# Patient Record
Sex: MALE | Race: WHITE | NOT HISPANIC OR LATINO | Employment: FULL TIME | URBAN - METROPOLITAN AREA
[De-identification: names, ages, dates, MRNs, and addresses within clinical notes are randomized per-mention and may not be internally consistent; named-entity substitution may affect disease eponyms.]

---

## 2019-10-19 ENCOUNTER — HOSPITAL ENCOUNTER (OUTPATIENT)
Dept: URGENT CARE | Facility: CLINIC | Age: 24
Discharge: HOME | End: 2019-10-19

## 2019-10-21 LAB — BACTERIA SPEC AEROBE CULT: NORMAL

## 2020-08-11 ENCOUNTER — CONVERSION ENCOUNTER (OUTPATIENT)
Dept: SURGERY | Facility: CLINIC | Age: 25
End: 2020-08-11

## 2020-08-11 ENCOUNTER — OFFICE VISIT CONVERTED (OUTPATIENT)
Dept: SURGERY | Facility: CLINIC | Age: 25
End: 2020-08-11
Attending: SURGERY

## 2020-08-18 ENCOUNTER — OFFICE VISIT CONVERTED (OUTPATIENT)
Dept: SURGERY | Facility: CLINIC | Age: 25
End: 2020-08-18
Attending: SURGERY

## 2020-09-12 ENCOUNTER — HOSPITAL ENCOUNTER (OUTPATIENT)
Dept: PREADMISSION TESTING | Facility: HOSPITAL | Age: 25
Discharge: HOME | End: 2020-09-12
Attending: SURGERY

## 2020-09-14 LAB — SARS-COV-2 RNA SPEC QL NAA+PROBE: NOT DETECTED

## 2020-09-17 ENCOUNTER — HOSPITAL ENCOUNTER (OUTPATIENT)
Dept: PERIOP | Facility: HOSPITAL | Age: 25
Setting detail: HOSPITAL OUTPATIENT SURGERY
Discharge: HOME | End: 2020-09-17
Attending: SURGERY

## 2020-09-25 ENCOUNTER — OFFICE VISIT CONVERTED (OUTPATIENT)
Dept: SURGERY | Facility: CLINIC | Age: 25
End: 2020-09-25
Attending: SURGERY

## 2020-10-02 ENCOUNTER — OFFICE VISIT CONVERTED (OUTPATIENT)
Dept: SURGERY | Facility: CLINIC | Age: 25
End: 2020-10-02
Attending: SURGERY

## 2020-10-16 ENCOUNTER — OFFICE VISIT CONVERTED (OUTPATIENT)
Dept: SURGERY | Facility: CLINIC | Age: 25
End: 2020-10-16
Attending: SURGERY

## 2020-10-16 ENCOUNTER — HOSPITAL ENCOUNTER (OUTPATIENT)
Dept: PREADMISSION TESTING | Facility: HOSPITAL | Age: 25
Discharge: HOME | End: 2020-10-16
Attending: SURGERY

## 2020-10-17 LAB — SARS-COV-2 RNA SPEC QL NAA+PROBE: NOT DETECTED

## 2021-05-10 NOTE — H&P
History and Physical      Patient Name: Loco Green   Patient ID: [id number]   Sex: Male   YOB: 1995        Visit Date: August 18, 2020    Provider: Andrei Chappell MD   Location[facility]   Location Address: [address] [place] [address]  [id number]   Location Phone: [phone]          Chief Complaint  · Pilonidal Cyst      History Of Present Illness  Loco Green is a 25 year old male who presents to the office today as a consult from [facility].      The patient was just seen by week about one to two weeks ago.  Most with pilonidal disease and I told him what would need to be done for surgery and requested that he go home and review some videos online before making a decision about proceeding with surgery.  Patient has done that and says he wants to proceed with surgery.  The office visit today was spent discussing the details of the surgery.  Copy and paste of the HPI and plan section from the prior office visit is available below.  There are no changes.    Copy & Paste of HPI Section from Prior Office Visit  The patient was referred to see me after he went to a local urgent care center for a draining area at his upper buttock area.  The patient says he had an incision and drainage of a pilonidal cyst about 5 years ago.  Since then the skin where the incision and drainage was performed occasionally pops open and drains fluid and he has pain when this occurs.  Patient does not have any pain right now.  No fevers.  Patient wants to have something done to eliminate this persistent problem.    Copy & Paste of Plan Section from Prior Office Visit  Diagnosis of pilonidal disease was discussed with the patient.  We talked about the procedure I would perform to hopefully eliminate his problems with pilonidal disease.  The specific procedure is a [place] cleft lift procedure.  I informed the patient about the expected recovery time.  And is for the patient to go home and  "research the procedure I reform on the Internet and watch videos that are available on the Internet and then schedule an appointment to see me again sometime when he would like to have the procedure performed.       Past Medical History  Disease Name Date Onset Notes   Pilonidal cyst --  --          Past Surgical History  Procedure Name Date Notes   Finger Surgery --  --    Tonsilectomy --  --          Allergy List  Allergen Name Date Reaction Notes   NO KNOWN DRUG ALLERGIES --  --  --        Allergies Reconciled  Social History  Finding Status Start/Stop Quantity Notes   Tobacco Current some day --/-- --  --          Review of Systems  · Constitutional  o Denies  o : chills, fever  · Gastrointestinal  o Denies  o : nausea, vomiting      Vitals  Date Time BP Position Site L\R Cuff Size HR RR TEMP (F) WT  HT  BMI kg/m2 BSA m2 O2 Sat HC       08/11/2020 10:05 AM       12  198lbs 8oz 5'  10\" 28.48 2.11     08/18/2020 08:32 AM       14  198lbs 0oz 5'  10\" 28.41 2.11           Physical Examination  · Constitutional  o Appearance  o : healthy appearing, alert and in no acute distress, reliable historian, here alone  · Head and Face  o Head  o :   § Inspection  § : no visable deformities or lesions  · Eyes  o Conjunctivae  o : clear  o Sclerae  o : clear  · Neck  o Inspection/Palpation  o : normal appearance, no masses, trachea midline  · Respiratory  o Respiratory Effort  o : breathing unlabored, respiratory effort appears normal  o Inspection of Chest  o : normal appearance, no retractions  · Cardiovascular  o Heart  o : regular rate and rhythm  · Gastrointestinal  o Abdominal Examination  o :   § Abdomen  § : soft, nontender, nondistended  · Skin and Subcutaneous Tissue  o General Inspection  o : Just to the left side of the upper part of the gluteal cleft, there is a area about 1 to 2 cm in diameter that is mildly red and there is an opening in the skin. There is no significant tenderness at this area. In the midline " gluteal cleft there are several visible skin pits. There is a moderate amount of hair on the buttock skin and the patient has a fairly deep gluteal cleft.  · Neurologic  o Cranial Nerves  o : no obvious motor deficits  o Sensation  o : no obvious sensory deficits  o Gait and Station  o :   § Gait Screening  § : normal gait, able to stand without diffculty  o Cerebellar Function  o : no obvious abnormalities  · Psychiatric  o Judgement and Insight  o : judgment and insight intact  o Mood and Affect  o : mood normal, affect appropriate          Assessment  · Pre-Surgical Orders     V72.84  · Pilonidal disease     709.8/L98.8      Plan  · Orders  o GENERAL SURGERY (GNSUR) - V72.84 - 09/17/2020  · Medications  o Medications have been Reconciled  o Transition of Care or Provider Policy  · Instructions  o PLAN: Pilonidal cystectomy with cleft lift procedure  o PLEASE SIGN PERMIT FOR: Pilonidal cystectomy with cleft lift procedure  o Anesthesia: General   o Outpatient  o O.R. PREP: Per protocol  o IV: Per Anesthesia  o SCD's preoperatively  o *__Clindamycin 900 mg IV on call to OR.  o The indications, options, risks, benefits, and expected outcomes of the planned procedure were discussed with the patient and the patient agrees to proceed.   o Surgical Facility: [facility]            Electronically Signed by: Andrei Chappell MD -Author on August 18, 2020 09:02:41 AM

## 2021-05-10 NOTE — H&P
"   History and Physical      Patient Name: Loco Green   Patient ID: [id number]   Sex: Male   YOB: 1995    Primary Care Provider: No PCP No PCP Other    Visit Date: August 11, 2020    Provider: Andrei Chappell MD   Location[facility]   Location Address: [address] [place] [address]  [id number]   Location Phone: [phone]          Chief Complaint  · Pilonidal Cyst      History Of Present Illness  Loco Green is a 24 year old male who presents to the office today as a consult from [facility].      The patient was referred to see me after he went to a local urgent care center for a draining area at his upper buttock area.  The patient says he had an incision and drainage of a pilonidal cyst about 5 years ago.  Since then the skin where the incision and drainage was performed occasionally pops open and drains fluid and he has pain when this occurs.  Patient does not have any pain right now.  No fevers.  Patient wants to have something done to eliminate this persistent problem.       Past Surgical History  Procedure Name Date Notes   Finger Surgery --  --    Tonsilectomy --  --          Allergy List  Allergen Name Date Reaction Notes   NO KNOWN DRUG ALLERGIES --  --  --          Social History  Finding Status Start/Stop Quantity Notes   Tobacco Current some day --/-- --  --          Review of Systems  · Constitutional  o Denies  o : chills, fever  · Gastrointestinal  o Denies  o : nausea, vomiting      Vitals  Date Time BP Position Site L\R Cuff Size HR RR TEMP (F) WT  HT  BMI kg/m2 BSA m2 O2 Sat        08/11/2020 10:05 AM       12  198lbs 8oz 5'  10\" 28.48 2.11           Physical Examination  · Constitutional  o Appearance  o : healthy appearing, alert and in no acute distress, reliable historian, here alone  · Head and Face  o Head  o :   § Inspection  § : no visable deformities or lesions  · Eyes  o Conjunctivae  o : clear  o Sclerae  o : " clear  · Neck  o Inspection/Palpation  o : normal appearance, no masses, trachea midline  · Respiratory  o Respiratory Effort  o : breathing unlabored, respiratory effort appears normal  o Inspection of Chest  o : normal appearance, no retractions  · Cardiovascular  o Heart  o : regular rate and rhythm  · Gastrointestinal  o Abdominal Examination  o :   § Abdomen  § : soft, nontender, nondistended  · Skin and Subcutaneous Tissue  o General Inspection  o : Just to the left side of the upper part of the gluteal cleft, there is a area about 1 to 2 cm in diameter that is mildly red and there is an opening in the skin. There is no significant tenderness at this area. In the midline gluteal cleft there are several visible skin pits. There is a moderate amount of hair on the buttock skin and the patient has a fairly deep gluteal cleft.  · Neurologic  o Cranial Nerves  o : no obvious motor deficits  o Sensation  o : no obvious sensory deficits  o Gait and Station  o :   § Gait Screening  § : normal gait, able to stand without diffculty  o Cerebellar Function  o : no obvious abnormalities  · Psychiatric  o Judgement and Insight  o : judgment and insight intact  o Mood and Affect  o : mood normal, affect appropriate          Assessment  · Pilonidal disease     709.8/L98.8      Plan  · Medications  o Medications have been Reconciled  o Transition of Care or Provider Policy  · Instructions  o Electronically Identified Patient Education Materials Provided Electronically     Diagnosis of pilonidal disease was discussed with the patient.  We talked about the procedure I would perform to hopefully eliminate his problems with pilonidal disease.  The specific procedure is a [place] cleft lift procedure.  I informed the patient about the expected recovery time.  And is for the patient to go home and research the procedure I reform on the Internet and watch videos that are available on the Internet and then schedule an appointment to  see me again sometime when he would like to have the procedure performed.             Electronically Signed by: Andrei Chappell MD -Author on August 11, 2020 10:30:52 AM

## 2021-05-13 NOTE — PROGRESS NOTES
"   Progress Note      Patient Name: Loco Green   Patient ID: [id number]   Sex: Male   YOB: 1995        Visit Date: September 25, 2020    Provider: Andrei Chappell MD   Location: [facility]   Location Address: [address] [place] [address]  [id number]   Location Phone: [phone]          Chief Complaint  · Follow up Surgery      History Of Present Illness  Loco Green is a 25 year old male who presents today for a postoperative visit.        Patient is here for his first follow-up appointment after excision of pilonidal disease with a cleft lift procedure 8 days ago.  He is doing very well.  I removed the drain today.  Fortunately, the incision is healing very well thus far.  Assessment is the patient is recovering satisfactorily after his surgery.  No new issues to address.  I will have the patient see me again next week to remove some of the sutures.            Vitals  Date Time BP Position Site L\R Cuff Size HR RR TEMP (F) WT  HT  BMI kg/m2 BSA m2 O2 Sat HC       09/25/2020 02:31 PM       12  204lbs 4oz 5'  10\" 29.31 2.14               Assessment  · Postoperative Exam Following Surgery     V67.00      Plan  · Medications  o Medications have been Reconciled  o Transition of Care or Provider Policy  · Instructions  o See Above HPI section.  o Electronically Identified Patient Education Materials Provided Electronically            Electronically Signed by: Andrei Chappell MD -Author on September 25, 2020 03:44:08 PM  "

## 2021-05-13 NOTE — PROGRESS NOTES
"   Progress Note      Patient Name: Loco Green   Patient ID: [id number]   Sex: Male   YOB: 1995        Visit Date: October 16, 2020    Provider: Andrei Chappell MD   Location: [facility]   Location Address: [address] [place] [address]  [id number]   Location Phone: [phone]          Chief Complaint  · Follow up Surgery  · Follow Up Visit      History Of Present Illness  Loco Green is a 25 year old male who presents today for a postoperative visit.      The patient is here for another follow-up appointment after excision of pilonidal disease.  He continues to do very well and has no complaints.  Incision has healed very well.  No new issues to address.  Patient seems very pleased with his progress.  He is excused to return to work without restrictions on 10/19/2020.  He can see me PRN.       Vitals  Date Time BP Position Site L\R Cuff Size HR RR TEMP (F) WT  HT  BMI kg/m2 BSA m2 O2 Sat FR L/min FiO2 HC       10/16/2020 01:54 PM       12  211lbs 2oz 5'  10\" 30.29 2.17                 Assessment  · Postoperative Exam Following Surgery     V67.00  · Return to work evaluation     V72.85/Z76.89      Plan  · Orders  o [facility] Scre[facility] (93850) - V72.85/Z76.89 - 10/16/2020  · Medications  o Medications have been Reconciled  o Transition of Care or Provider Policy  · Instructions  o See Above HPI section.  o Electronically Identified Patient Education Materials Provided Electronically            Electronically Signed by: Andrei Chappell MD -Author on October 16, 2020 02:27:58 PM  "

## 2021-05-13 NOTE — PROGRESS NOTES
"   Progress Note      Patient Name: Loco Green   Patient ID: [id number]   Sex: Male   YOB: 1995        Visit Date: October 2, 2020    Provider: Andrei Chappell MD   Location: [facility]   Location Address: [address] [place] [address]  [id number]   Location Phone: [phone]          Chief Complaint  · Follow up Surgery      History Of Present Illness  Loco Green is a 25 year old male who presents today for a postoperative visit.      Patient is here for another follow-up appointment after excision of pilonidal disease with a cleft lift procedure.  He continues to do well.  No complaints.  I removed the Prolene sutures today.  The wound is healing fine thus far.  My assessment is the patient is recovering satisfactorily so far.  I will have him see me again in 2 weeks for another evaluation.       Vitals  Date Time BP Position Site L\R Cuff Size HR RR TEMP (F) WT  HT  BMI kg/m2 BSA m2 O2 Sat FR L/min FiO2 HC       10/02/2020 02:30 PM       14  203lbs 16oz 5'  10\" 29.27 2.14                 Assessment  · Postoperative Exam Following Surgery     V67.00      Plan  · Medications  o Medications have been Reconciled  o Transition of Care or Provider Policy  · Instructions  o See Above HPI section.  o Electronically Identified Patient Education Materials Provided Electronically            Electronically Signed by: Andrei Chappell MD -Author on October 2, 2020 02:55:46 PM  "

## 2021-05-14 VITALS — RESPIRATION RATE: 12 BRPM | BODY MASS INDEX: 30.22 KG/M2 | WEIGHT: 211.12 LBS | HEIGHT: 70 IN

## 2021-05-14 VITALS — HEIGHT: 70 IN | BODY MASS INDEX: 28.35 KG/M2 | RESPIRATION RATE: 14 BRPM | WEIGHT: 198 LBS

## 2021-05-14 VITALS — BODY MASS INDEX: 29.2 KG/M2 | WEIGHT: 204 LBS | RESPIRATION RATE: 14 BRPM | HEIGHT: 70 IN

## 2021-05-14 VITALS — BODY MASS INDEX: 29.24 KG/M2 | HEIGHT: 70 IN | WEIGHT: 204.25 LBS | RESPIRATION RATE: 12 BRPM

## 2021-05-15 VITALS — BODY MASS INDEX: 28.42 KG/M2 | RESPIRATION RATE: 12 BRPM | WEIGHT: 198.5 LBS | HEIGHT: 70 IN

## 2024-08-14 ENCOUNTER — OFFICE VISIT (OUTPATIENT)
Dept: INTERNAL MEDICINE | Age: 29
End: 2024-08-14
Payer: COMMERCIAL

## 2024-08-14 ENCOUNTER — LAB (OUTPATIENT)
Dept: LAB | Facility: HOSPITAL | Age: 29
End: 2024-08-14
Payer: COMMERCIAL

## 2024-08-14 VITALS
TEMPERATURE: 98.4 F | OXYGEN SATURATION: 98 % | SYSTOLIC BLOOD PRESSURE: 112 MMHG | WEIGHT: 223.4 LBS | HEIGHT: 70 IN | BODY MASS INDEX: 31.98 KG/M2 | DIASTOLIC BLOOD PRESSURE: 76 MMHG | HEART RATE: 76 BPM

## 2024-08-14 DIAGNOSIS — L30.9 DERMATITIS: Primary | ICD-10-CM

## 2024-08-14 DIAGNOSIS — E66.9 OBESITY (BMI 30-39.9): ICD-10-CM

## 2024-08-14 DIAGNOSIS — Z11.59 NEED FOR HEPATITIS C SCREENING TEST: ICD-10-CM

## 2024-08-14 DIAGNOSIS — Z00.00 LABORATORY EXAMINATION ORDERED AS PART OF A ROUTINE GENERAL MEDICAL EXAMINATION: ICD-10-CM

## 2024-08-14 DIAGNOSIS — Z30.09 FAMILY PLANNING: ICD-10-CM

## 2024-08-14 DIAGNOSIS — Z83.3 FAMILY HISTORY OF DIABETES MELLITUS IN MOTHER: ICD-10-CM

## 2024-08-14 DIAGNOSIS — Z13.6 ENCOUNTER FOR SCREENING FOR CARDIOVASCULAR DISORDERS: ICD-10-CM

## 2024-08-14 DIAGNOSIS — E55.9 VITAMIN D DEFICIENCY: ICD-10-CM

## 2024-08-14 PROBLEM — L40.9 PSORIASIS: Status: ACTIVE | Noted: 2024-08-14

## 2024-08-14 LAB
25(OH)D3 SERPL-MCNC: 22.6 NG/ML (ref 30–100)
ABO GROUP BLD: NORMAL
ALBUMIN SERPL-MCNC: 4.7 G/DL (ref 3.5–5.2)
ALBUMIN UR-MCNC: 1.5 MG/DL
ALBUMIN/GLOB SERPL: 1.9 G/DL
ALP SERPL-CCNC: 79 U/L (ref 39–117)
ALT SERPL W P-5'-P-CCNC: 49 U/L (ref 1–41)
ANION GAP SERPL CALCULATED.3IONS-SCNC: 10 MMOL/L (ref 5–15)
AST SERPL-CCNC: 25 U/L (ref 1–40)
BASOPHILS # BLD AUTO: 0.02 10*3/MM3 (ref 0–0.2)
BASOPHILS NFR BLD AUTO: 0.3 % (ref 0–1.5)
BILIRUB SERPL-MCNC: 0.7 MG/DL (ref 0–1.2)
BUN SERPL-MCNC: 17 MG/DL (ref 6–20)
BUN/CREAT SERPL: 18.3 (ref 7–25)
CALCIUM SPEC-SCNC: 10 MG/DL (ref 8.6–10.5)
CHLORIDE SERPL-SCNC: 101 MMOL/L (ref 98–107)
CHOLEST SERPL-MCNC: 139 MG/DL (ref 0–200)
CO2 SERPL-SCNC: 26 MMOL/L (ref 22–29)
CREAT SERPL-MCNC: 0.93 MG/DL (ref 0.76–1.27)
CREAT UR-MCNC: 362.5 MG/DL
DEPRECATED RDW RBC AUTO: 39.6 FL (ref 37–54)
EGFRCR SERPLBLD CKD-EPI 2021: 114.7 ML/MIN/1.73
EOSINOPHIL # BLD AUTO: 0.07 10*3/MM3 (ref 0–0.4)
EOSINOPHIL NFR BLD AUTO: 1.1 % (ref 0.3–6.2)
ERYTHROCYTE [DISTWIDTH] IN BLOOD BY AUTOMATED COUNT: 12 % (ref 12.3–15.4)
FOLATE SERPL-MCNC: 7.49 NG/ML (ref 4.78–24.2)
GLOBULIN UR ELPH-MCNC: 2.5 GM/DL
GLUCOSE SERPL-MCNC: 94 MG/DL (ref 65–99)
HBA1C MFR BLD: 5.3 % (ref 4.8–5.6)
HCT VFR BLD AUTO: 45.1 % (ref 37.5–51)
HCV AB SER QL: NORMAL
HDLC SERPL-MCNC: 39 MG/DL (ref 40–60)
HGB BLD-MCNC: 15.3 G/DL (ref 13–17.7)
IMM GRANULOCYTES # BLD AUTO: 0.02 10*3/MM3 (ref 0–0.05)
IMM GRANULOCYTES NFR BLD AUTO: 0.3 % (ref 0–0.5)
LDLC SERPL CALC-MCNC: 82 MG/DL (ref 0–100)
LDLC/HDLC SERPL: 2.06 {RATIO}
LYMPHOCYTES # BLD AUTO: 1.71 10*3/MM3 (ref 0.7–3.1)
LYMPHOCYTES NFR BLD AUTO: 26.1 % (ref 19.6–45.3)
MAGNESIUM SERPL-MCNC: 2.3 MG/DL (ref 1.6–2.6)
MCH RBC QN AUTO: 31 PG (ref 26.6–33)
MCHC RBC AUTO-ENTMCNC: 33.9 G/DL (ref 31.5–35.7)
MCV RBC AUTO: 91.5 FL (ref 79–97)
MICROALBUMIN/CREAT UR: 4.1 MG/G (ref 0–29)
MONOCYTES # BLD AUTO: 0.66 10*3/MM3 (ref 0.1–0.9)
MONOCYTES NFR BLD AUTO: 10.1 % (ref 5–12)
NEUTROPHILS NFR BLD AUTO: 4.08 10*3/MM3 (ref 1.7–7)
NEUTROPHILS NFR BLD AUTO: 62.1 % (ref 42.7–76)
NRBC BLD AUTO-RTO: 0 /100 WBC (ref 0–0.2)
PLATELET # BLD AUTO: 228 10*3/MM3 (ref 140–450)
PMV BLD AUTO: 11.2 FL (ref 6–12)
POTASSIUM SERPL-SCNC: 4.7 MMOL/L (ref 3.5–5.2)
PROT SERPL-MCNC: 7.2 G/DL (ref 6–8.5)
RBC # BLD AUTO: 4.93 10*6/MM3 (ref 4.14–5.8)
RH BLD: POSITIVE
SODIUM SERPL-SCNC: 137 MMOL/L (ref 136–145)
T3FREE SERPL-MCNC: 3.68 PG/ML (ref 2–4.4)
T4 FREE SERPL-MCNC: 1.35 NG/DL (ref 0.92–1.68)
TRIGL SERPL-MCNC: 98 MG/DL (ref 0–150)
TSH SERPL DL<=0.05 MIU/L-ACNC: 1.43 UIU/ML (ref 0.27–4.2)
VIT B12 BLD-MCNC: 827 PG/ML (ref 211–946)
VLDLC SERPL-MCNC: 18 MG/DL (ref 5–40)
WBC NRBC COR # BLD AUTO: 6.56 10*3/MM3 (ref 3.4–10.8)

## 2024-08-14 PROCEDURE — 83735 ASSAY OF MAGNESIUM: CPT | Performed by: INTERNAL MEDICINE

## 2024-08-14 PROCEDURE — 82043 UR ALBUMIN QUANTITATIVE: CPT | Performed by: INTERNAL MEDICINE

## 2024-08-14 PROCEDURE — 80061 LIPID PANEL: CPT | Performed by: INTERNAL MEDICINE

## 2024-08-14 PROCEDURE — 84439 ASSAY OF FREE THYROXINE: CPT | Performed by: INTERNAL MEDICINE

## 2024-08-14 PROCEDURE — 86900 BLOOD TYPING SEROLOGIC ABO: CPT | Performed by: INTERNAL MEDICINE

## 2024-08-14 PROCEDURE — 86803 HEPATITIS C AB TEST: CPT | Performed by: INTERNAL MEDICINE

## 2024-08-14 PROCEDURE — 84443 ASSAY THYROID STIM HORMONE: CPT | Performed by: INTERNAL MEDICINE

## 2024-08-14 PROCEDURE — 80053 COMPREHEN METABOLIC PANEL: CPT | Performed by: INTERNAL MEDICINE

## 2024-08-14 PROCEDURE — 84481 FREE ASSAY (FT-3): CPT | Performed by: INTERNAL MEDICINE

## 2024-08-14 PROCEDURE — 86901 BLOOD TYPING SEROLOGIC RH(D): CPT | Performed by: INTERNAL MEDICINE

## 2024-08-14 PROCEDURE — 85025 COMPLETE CBC W/AUTO DIFF WBC: CPT | Performed by: INTERNAL MEDICINE

## 2024-08-14 PROCEDURE — 82746 ASSAY OF FOLIC ACID SERUM: CPT | Performed by: INTERNAL MEDICINE

## 2024-08-14 PROCEDURE — 83036 HEMOGLOBIN GLYCOSYLATED A1C: CPT | Performed by: INTERNAL MEDICINE

## 2024-08-14 PROCEDURE — 36415 COLL VENOUS BLD VENIPUNCTURE: CPT | Performed by: INTERNAL MEDICINE

## 2024-08-14 PROCEDURE — 99213 OFFICE O/P EST LOW 20 MIN: CPT | Performed by: INTERNAL MEDICINE

## 2024-08-14 PROCEDURE — 82306 VITAMIN D 25 HYDROXY: CPT | Performed by: INTERNAL MEDICINE

## 2024-08-14 PROCEDURE — 82607 VITAMIN B-12: CPT | Performed by: INTERNAL MEDICINE

## 2024-08-14 PROCEDURE — 82570 ASSAY OF URINE CREATININE: CPT | Performed by: INTERNAL MEDICINE

## 2024-08-14 RX ORDER — TRIAMCINOLONE ACETONIDE 1 MG/G
1 CREAM TOPICAL 2 TIMES DAILY
Qty: 45 G | Refills: 0 | Status: SHIPPED | OUTPATIENT
Start: 2024-08-14

## 2024-08-14 RX ORDER — CALCIPOTRIENE AND BETAMETHASONE DIPROPIONATE 50; .5 UG/G; MG/G
SUSPENSION TOPICAL
Qty: 3 G | Refills: 0 | Status: SHIPPED | OUTPATIENT
Start: 2024-08-14

## 2024-08-14 NOTE — PROGRESS NOTES
"[facility]  CHIEF COMPLAINT  [place] presents to [facility] to Establish Care (Needing a PCP ) and Psoriasis (On his head and skin )     HPI  28-year-old patient here to establish care-no previous PCP-    Past medical history- psoriasis? Lesion on left knee with crusts /scalp with flakes--for yrs-no response to selsun shampoo and head and shoulders--using shampoo/conditioner from his sister in law and helping-    PM-has appt with fertility checkup    - works for [employer], no ETOH no drugs-dips-    Past History:  Allergies: Patient has no known allergies.   Medical History: has no past medical history on file.   Surgical History: has a past surgical history that includes Cyst Removal (2020).   Family History: family history includes COPD in his mother; Diabetes in his mother; Heart disease in his mother.   Social History: reports that he has never smoked. His smokeless tobacco use includes chew. He reports that he does not currently use alcohol.  Social History     Social History Narrative    Not on file         Current Outpatient Medications:     calcipotriene-betamethasone (Taclonex) 0.005-0.064 % external suspension, Apply q d for up to 8 weeks (max 100 g/week), Disp: 3 g, Rfl: 0    triamcinolone (KENALOG) 0.1 % cream, Apply 1 Application topically to the appropriate area as directed 2 (Two) Times a Day., Disp: 45 g, Rfl: 0     OBJECTIVE  Vital Signs  Vitals:    08/14/24 1002   BP: 112/76   BP Location: Left arm   Patient Position: Sitting   Cuff Size: Small Adult   Pulse: 76   Temp: 98.4 °F (36.9 °C)   TempSrc: Temporal   SpO2: 98%   Weight: 101 kg (223 lb 6.4 oz)   Height: 177.8 cm (70\")      Body mass index is 32.05 kg/m².      Physical Exam  Vitals and nursing note reviewed.   Constitutional:       Appearance: Normal appearance.      Comments: Scalp hair with flakes seen grossly left knee -2 x 4 cm flat with flat lichenified lesion some crust same with the " "right forearm area about 2 x 3 cm   HENT:      Head: Normocephalic and atraumatic.      Nose: Nose normal.   Eyes:      Extraocular Movements: Extraocular movements intact.      Pupils: Pupils are equal, round, and reactive to light.   Cardiovascular:      Rate and Rhythm: Normal rate and regular rhythm.   Pulmonary:      Effort: Pulmonary effort is normal.      Breath sounds: Normal breath sounds.   Abdominal:      General: Abdomen is flat.      Palpations: Abdomen is soft.   Musculoskeletal:      Cervical back: Normal range of motion and neck supple.   Skin:     General: Skin is warm and dry.   Neurological:      General: No focal deficit present.      Mental Status: He is alert and oriented to person, place, and time.   Psychiatric:         Mood and Affect: Mood normal.         Behavior: Behavior normal.         RESULTS REVIEW  No results found for: \"PROBNP\", \"BNP\"          No results found for: \"TSH\"   No results found for: \"FREET4\"         No Images in the past 120 days found..              ASSESSMENT & PLAN  Diagnoses and all orders for this visit:    1. Dermatitis (Primary)  Comments:  Possible scalp psoriasis and psoriasis on left knee try Taclonex on the scalp qd for 4 to 8 weeks and steroid cream to left knee for 2 to 4 weeks refer to Derm  Overview:  Possible scalp psoriasis and psoriasis on left knee try Taclonex on the scalp qd for 4 to 8 weeks and steroid cream to left knee for 2 to 4 weeks refer to Derm    Orders:  -     Comprehensive Metabolic Panel; Future  -     Lipid Panel; Future  -     CBC & Differential; Future  -     Vitamin B12; Future  -     Folate; Future  -     Vitamin D,25-Hydroxy; Future  -     Magnesium; Future  -     Microalbumin / Creatinine Urine Ratio - Urine, Clean Catch; Future  -     Hemoglobin A1c; Future  -     TSH+Free T4; Future  -     T3, Free; Future  -     ABO/Rh; Future  -     triamcinolone (KENALOG) 0.1 % cream; Apply 1 Application topically to the appropriate area as " directed 2 (Two) Times a Day.  Dispense: 45 g; Refill: 0  -     calcipotriene-betamethasone (Taclonex) 0.005-0.064 % external suspension; Apply q d for up to 8 weeks (max 100 g/week)  Dispense: 3 g; Refill: 0  -     Hepatitis C Antibody; Future  -     Ambulatory Referral to Dermatology  -     ABO/Rh  -     Hepatitis C Antibody  -     T3, Free  -     TSH+Free T4  -     Hemoglobin A1c  -     Magnesium  -     Vitamin D,25-Hydroxy  -     Folate  -     Vitamin B12  -     CBC & Differential  -     Lipid Panel  -     Comprehensive Metabolic Panel  -     Microalbumin / Creatinine Urine Ratio - Urine, Clean Catch    2. Laboratory examination ordered as part of a routine general medical examination  -     Comprehensive Metabolic Panel; Future  -     Lipid Panel; Future  -     CBC & Differential; Future  -     Vitamin B12; Future  -     Folate; Future  -     Vitamin D,25-Hydroxy; Future  -     Magnesium; Future  -     Microalbumin / Creatinine Urine Ratio - Urine, Clean Catch; Future  -     Hemoglobin A1c; Future  -     TSH+Free T4; Future  -     T3, Free; Future  -     ABO/Rh; Future  -     triamcinolone (KENALOG) 0.1 % cream; Apply 1 Application topically to the appropriate area as directed 2 (Two) Times a Day.  Dispense: 45 g; Refill: 0  -     calcipotriene-betamethasone (Taclonex) 0.005-0.064 % external suspension; Apply q d for up to 8 weeks (max 100 g/week)  Dispense: 3 g; Refill: 0  -     Hepatitis C Antibody; Future  -     Ambulatory Referral to Dermatology  -     ABO/Rh  -     Hepatitis C Antibody  -     T3, Free  -     TSH+Free T4  -     Hemoglobin A1c  -     Magnesium  -     Vitamin D,25-Hydroxy  -     Folate  -     Vitamin B12  -     CBC & Differential  -     Lipid Panel  -     Comprehensive Metabolic Panel  -     Microalbumin / Creatinine Urine Ratio - Urine, Clean Catch    3. Encounter for screening for cardiovascular disorders  -     Comprehensive Metabolic Panel; Future  -     Lipid Panel; Future  -     CBC &  Differential; Future  -     Vitamin B12; Future  -     Folate; Future  -     Vitamin D,25-Hydroxy; Future  -     Magnesium; Future  -     Microalbumin / Creatinine Urine Ratio - Urine, Clean Catch; Future  -     Hemoglobin A1c; Future  -     TSH+Free T4; Future  -     T3, Free; Future  -     ABO/Rh; Future  -     triamcinolone (KENALOG) 0.1 % cream; Apply 1 Application topically to the appropriate area as directed 2 (Two) Times a Day.  Dispense: 45 g; Refill: 0  -     calcipotriene-betamethasone (Taclonex) 0.005-0.064 % external suspension; Apply q d for up to 8 weeks (max 100 g/week)  Dispense: 3 g; Refill: 0  -     Hepatitis C Antibody; Future  -     Ambulatory Referral to Dermatology  -     ABO/Rh  -     Hepatitis C Antibody  -     T3, Free  -     TSH+Free T4  -     Hemoglobin A1c  -     Magnesium  -     Vitamin D,25-Hydroxy  -     Folate  -     Vitamin B12  -     CBC & Differential  -     Lipid Panel  -     Comprehensive Metabolic Panel  -     Microalbumin / Creatinine Urine Ratio - Urine, Clean Catch    4. Obesity (BMI 30-39.9)  Comments:  Ideal body weight is 180 to 200 pounds cut back on portions walk least 30 minutes daily-check routine labs  Orders:  -     Comprehensive Metabolic Panel; Future  -     Lipid Panel; Future  -     CBC & Differential; Future  -     Vitamin B12; Future  -     Folate; Future  -     Vitamin D,25-Hydroxy; Future  -     Magnesium; Future  -     Microalbumin / Creatinine Urine Ratio - Urine, Clean Catch; Future  -     Hemoglobin A1c; Future  -     TSH+Free T4; Future  -     T3, Free; Future  -     ABO/Rh; Future  -     triamcinolone (KENALOG) 0.1 % cream; Apply 1 Application topically to the appropriate area as directed 2 (Two) Times a Day.  Dispense: 45 g; Refill: 0  -     calcipotriene-betamethasone (Taclonex) 0.005-0.064 % external suspension; Apply q d for up to 8 weeks (max 100 g/week)  Dispense: 3 g; Refill: 0  -     Hepatitis C Antibody; Future  -     Ambulatory Referral to  Dermatology  -     ABO/Rh  -     Hepatitis C Antibody  -     T3, Free  -     TSH+Free T4  -     Hemoglobin A1c  -     Magnesium  -     Vitamin D,25-Hydroxy  -     Folate  -     Vitamin B12  -     CBC & Differential  -     Lipid Panel  -     Comprehensive Metabolic Panel  -     Microalbumin / Creatinine Urine Ratio - Urine, Clean Catch    5. Need for hepatitis C screening test  -     Comprehensive Metabolic Panel; Future  -     Lipid Panel; Future  -     CBC & Differential; Future  -     Vitamin B12; Future  -     Folate; Future  -     Vitamin D,25-Hydroxy; Future  -     Magnesium; Future  -     Microalbumin / Creatinine Urine Ratio - Urine, Clean Catch; Future  -     Hemoglobin A1c; Future  -     TSH+Free T4; Future  -     T3, Free; Future  -     ABO/Rh; Future  -     triamcinolone (KENALOG) 0.1 % cream; Apply 1 Application topically to the appropriate area as directed 2 (Two) Times a Day.  Dispense: 45 g; Refill: 0  -     calcipotriene-betamethasone (Taclonex) 0.005-0.064 % external suspension; Apply q d for up to 8 weeks (max 100 g/week)  Dispense: 3 g; Refill: 0  -     Hepatitis C Antibody; Future  -     Ambulatory Referral to Dermatology  -     ABO/Rh  -     Hepatitis C Antibody  -     T3, Free  -     TSH+Free T4  -     Hemoglobin A1c  -     Magnesium  -     Vitamin D,25-Hydroxy  -     Folate  -     Vitamin B12  -     CBC & Differential  -     Lipid Panel  -     Comprehensive Metabolic Panel  -     Microalbumin / Creatinine Urine Ratio - Urine, Clean Catch    6. Family history of diabetes mellitus in mother  Comments:  Check A1c  Orders:  -     Comprehensive Metabolic Panel; Future  -     Lipid Panel; Future  -     CBC & Differential; Future  -     Vitamin B12; Future  -     Folate; Future  -     Vitamin D,25-Hydroxy; Future  -     Magnesium; Future  -     Microalbumin / Creatinine Urine Ratio - Urine, Clean Catch; Future  -     Hemoglobin A1c; Future  -     TSH+Free T4; Future  -     T3, Free; Future  -      ABO/Rh; Future  -     triamcinolone (KENALOG) 0.1 % cream; Apply 1 Application topically to the appropriate area as directed 2 (Two) Times a Day.  Dispense: 45 g; Refill: 0  -     calcipotriene-betamethasone (Taclonex) 0.005-0.064 % external suspension; Apply q d for up to 8 weeks (max 100 g/week)  Dispense: 3 g; Refill: 0  -     Hepatitis C Antibody; Future  -     Ambulatory Referral to Dermatology  -     ABO/Rh  -     Hepatitis C Antibody  -     T3, Free  -     TSH+Free T4  -     Hemoglobin A1c  -     Magnesium  -     Vitamin D,25-Hydroxy  -     Folate  -     Vitamin B12  -     CBC & Differential  -     Lipid Panel  -     Comprehensive Metabolic Panel  -     Microalbumin / Creatinine Urine Ratio - Urine, Clean Catch    7. Family planning  Comments:  Patient has appointment with fertility specialist.  Here to establish PCP and monitor for any medical problems-will do labs         BMI is >= 30 and <35. (Class 1 Obesity). The following options were offered after discussion;: weight loss educational material (shared in after visit summary), exercise counseling/recommendations, and nutrition counseling/recommendations       Patient Instructions   IBW-180-200 lbs  Cut back on carbs  Do labs today  Use Taclonex to scalp every day for up to 8 weeks maximum 100 g/day  Try triamcinolone cream daily for 2 to 4 weeks to lesions on the and arm  May still need referral to Derm will initiate  Follow-up in 3 to 4 weeks for physical and follow-up.  Patient wants to know blood type will check and add to labs today      Addendum 8/16/2024  Low vitamin D levels will give high-dose and then start vitamin D 2000 units daily  FOLLOW UP  Return in about 4 weeks (around 9/11/2024) for Recheck, Annual physical.    Patient was given instructions and counseling regarding his condition or for health maintenance advice. Please see specific information pulled into the AVS if appropriate.

## 2024-08-14 NOTE — PATIENT INSTRUCTIONS
IBW-180-200 lbs  Cut back on carbs  Do labs today  Use Taclonex to scalp every day for up to 8 weeks maximum 100 g/day  Try triamcinolone cream daily for 2 to 4 weeks to lesions on the and arm  May still need referral to Derm will initiate  Follow-up in 3 to 4 weeks for physical and follow-up.

## 2024-08-15 ENCOUNTER — PATIENT ROUNDING (BHMG ONLY) (OUTPATIENT)
Dept: INTERNAL MEDICINE | Age: 29
End: 2024-08-15
Payer: COMMERCIAL

## 2024-08-16 RX ORDER — ERGOCALCIFEROL 1.25 MG/1
50000 CAPSULE ORAL WEEKLY
Qty: 5 CAPSULE | Refills: 0 | Status: SHIPPED | OUTPATIENT
Start: 2024-08-16